# Patient Record
Sex: FEMALE | Race: WHITE | NOT HISPANIC OR LATINO | URBAN - METROPOLITAN AREA
[De-identification: names, ages, dates, MRNs, and addresses within clinical notes are randomized per-mention and may not be internally consistent; named-entity substitution may affect disease eponyms.]

---

## 2024-10-04 ENCOUNTER — PROCEDURE VISIT (OUTPATIENT)
Dept: GYNECOLOGY | Facility: CLINIC | Age: 34
End: 2024-10-04

## 2024-10-04 VITALS
TEMPERATURE: 99 F | DIASTOLIC BLOOD PRESSURE: 88 MMHG | RESPIRATION RATE: 18 BRPM | SYSTOLIC BLOOD PRESSURE: 128 MMHG | WEIGHT: 171 LBS | OXYGEN SATURATION: 100 % | HEART RATE: 82 BPM | BODY MASS INDEX: 25.91 KG/M2 | HEIGHT: 68 IN

## 2024-10-04 DIAGNOSIS — R87.810 ASCUS WITH POSITIVE HIGH RISK HPV CERVICAL: ICD-10-CM

## 2024-10-04 DIAGNOSIS — R87.610 ASCUS WITH POSITIVE HIGH RISK HPV CERVICAL: ICD-10-CM

## 2024-10-04 RX ORDER — OMEPRAZOLE 40 MG/1
40 CAPSULE, DELAYED RELEASE ORAL
COMMUNITY
Start: 2024-08-19

## 2024-10-04 RX ORDER — TRANEXAMIC ACID 650 MG/1
1300 TABLET ORAL 3 TIMES DAILY
COMMUNITY
Start: 2024-05-14

## 2024-10-04 RX ORDER — DIAZEPAM 10 MG/1
10 TABLET ORAL NIGHTLY
COMMUNITY
Start: 2024-09-23

## 2024-10-04 RX ORDER — DEXTROAMPHETAMINE SACCHARATE, AMPHETAMINE ASPARTATE, DEXTROAMPHETAMINE SULFATE AND AMPHETAMINE SULFATE 7.5; 7.5; 7.5; 7.5 MG/1; MG/1; MG/1; MG/1
2 TABLET ORAL
COMMUNITY
Start: 2024-09-23

## 2024-10-04 NOTE — PROCEDURES
Colposcopy    Date/Time: 10/4/2024 8:00 AM    Performed by: Baron Gillespie MD  Authorized by: Jeff Fam MD    Consent obatined:  Prior to procedure the appropriate consent was completed and verified  Timeout:Immediately prior to procedure a time out was called to verify the correct patient, procedure, equipment, support staff and site/side marked as required    Colposcopy Site:  Cervix  Acrowhite Lesion? Yes (Present along entire posterior cervix from 3-9 o'clock)    Atypical Vessels: No    Transformation Zone Adequate?: Yes    Biopsy?: Yes         Location:  Cervix ((8 00 and 6 00 (Areas of densest acetowhite lesions)))  ECC Performed?: Yes    LEEP Performed?: No    Estimated blood loss (cc):  5   Patient tolerated the procedure well with no immediate complications.   Post-operative instructions were provided for the patient.   Patient was discharged and will follow up if any complications occur              [facility] COLPOSCOPY CLINIC NOTE    Melba Dawn is a 34 y.o.  referred to [facility] colposcopy clinic from Dr. Neri.    Passing massive blood clots 2x per x4 years, periods heavier    Results of abnormal pap smear were discussed with patient. Indications/risks/benefits of colposcopy were discussed and consents were signed and witnessed prior to the procedure, all questions answered.    Patient reports a questionable abnormal PAP smear in 2013 ( last PAP smear) with recommended repeat in 1 yr which was normal at that time.    Pap/Colpo History:  2024: ascus, hr hpv + untyped    Sexual History:  Number of sex partners: Currently active with 1 partner for 3 years; Lifetime 20  Contraception: Nexplanon, OCPs  Future fertility desires: None  Smoking History: No  STD History: No  HIV status: Negative  HPV vaccination status: Not vaccinated, interested in 1st vaccine today    GYN History:  Last menstrual period: 2024    OB History:   OB History          2    Para   2    Term   2         "    AB        Living             SAB        IAB        Ectopic        Multiple        Live Births                   Past Medical History:   Diagnosis Date    Abnormal Pap smear of cervix      History reviewed. No pertinent surgical history.  Social History     Tobacco Use    Smoking status: Never    Smokeless tobacco: Never   Substance Use Topics    Alcohol use: Not Currently     Comment: occ    Drug use: Never     Review of Systems  Pertinent items are noted in HPI.    Objective:     /88 (BP Location: Right arm, Patient Position: Sitting)   Pulse 82   Temp 98.5 °F (36.9 °C) (Oral)   Resp 18   Ht 5' 8" (1.727 m)   Wt 77.6 kg (171 lb)   LMP 2024 (Exact Date)   SpO2 100%   BMI 26.00 kg/m²   Physical Exam:  Gen: Well-nourished, well-developed female appearing stated age. Alert, cooperative, in no acute distress.    Urine Pregnancy Test: Negative    SEE COLPOSCOPY PROCEDURE NOTE    Assessment:       34 y.o.  here for:  1. ASCUS with positive high risk HPV cervical  Ambulatory referral/consult to Gynecology    hpv vaccine,9-yusuf (GARDASIL 9) vaccine 0.5 mL    Specimen to Pathology Gynecology and Obstetrics           COLPOSCOPIC IMPRESSION: Mild dysplasia    Plan:     - Colposcopy adequate  - Precautions given to the patient to return to ED if vaginal bleeding, fevers, pain, or any other concerns. Patient expressed understanding and all questions were answered.  - Counseled about HPV vaccination, would like to begin gardasil series.  - Will contact patient with results and determine further follow up at that time.    Problem List Items Addressed This Visit    None  Visit Diagnoses       ASCUS with positive high risk HPV cervical        Relevant Medications    hpv vaccine,9-yusuf (GARDASIL 9) vaccine 0.5 mL    Other Relevant Orders    Specimen to Pathology Gynecology and Obstetrics          Discussed with Dr. Fam who was present for the entire procedure.      Baron Valerio MD  [facility] " [facility]

## 2024-10-20 ENCOUNTER — TELEPHONE (OUTPATIENT)
Dept: GYNECOLOGY | Facility: CLINIC | Age: 34
End: 2024-10-20
Payer: MEDICAID

## 2024-10-20 NOTE — TELEPHONE ENCOUNTER
MD attempted to contact patient regarding colposcopy results. Attempted to call all listed phone numbers with no success. HIPAA-compliant VM left on mobile phone number.     Will contact patient at another time.       Fiona Soto MD   PGY2, Obstetrics & Gynecology   [facility]

## 2024-11-11 ENCOUNTER — TELEPHONE (OUTPATIENT)
Dept: GYNECOLOGY | Facility: CLINIC | Age: 34
End: 2024-11-11
Payer: MEDICAID

## 2024-11-12 ENCOUNTER — TELEPHONE (OUTPATIENT)
Dept: GYNECOLOGY | Facility: CLINIC | Age: 34
End: 2024-11-12
Payer: MEDICAID

## 2024-11-12 NOTE — TELEPHONE ENCOUNTER
Called patient to inform that she needs to repeat pap in 1 year with co-testing. No other intervention at this time, per provider. Patient verbalized understanding.       ----- Message from Laura De La Rosa sent at 11/11/2024  8:09 PM CST -----  Regarding: RE: Request for Lab Results  Called back no answer. Repeat pap in 1 year with co-testing. No other intervention at this time.     Laura De La Rosa MD   PGY2, Obstetrics & Gynecology   [facility]  ----- Message -----  From: Kathy Resendiz MA  Sent: 11/8/2024   3:54 PM CST  To: [facility] Residents  Subject: FW: Request for Lab Results                        ----- Message -----  From: Eldon Lafleur  Sent: 11/8/2024   3:12 PM CST  To: [facility]ne[facility] Clinical Support Staff  Subject: Request for Lab Results                          Pt called to request lab results from her COLPO on 10/4/24.  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  Her call back # is [phone].

## 2024-11-12 NOTE — TELEPHONE ENCOUNTER
MD made HIPAA compliant phone call to discuss colpo results. No Answer. Patient to get pap in 1 year with cotesting.     Laura De La Rosa MD   PGY2, Obstetrics & Gynecology   [place]

## 2024-11-13 ENCOUNTER — TELEPHONE (OUTPATIENT)
Dept: GYNECOLOGY | Facility: CLINIC | Age: 34
End: 2024-11-13
Payer: MEDICAID

## 2024-11-13 NOTE — TELEPHONE ENCOUNTER
sent certified letter tracking number [id number] [phone] sent for patient to call for colpo results, has not answered phone calls -I have called 2 numbers and left voicemails for patient to call me back, will send certified letter to day ---- Message from ABBIE Kearney sent at 11/12/2024  2:48 PM CST -----  Regarding: FW: results  Called and LVM for patient to call us back for results  ----- Message -----  From: Laura De La Rosa MD  Sent: 11/11/2024   8:10 PM CST  To: Christel Gould RN  Subject: RE: results                                      Called patient back. No answer, plan is to repeat pap in 1 year with HPV cotesting. Thanks!     Laura De La Rosa MD   PGY2, Obstetrics & Gynecology   [facility]  ----- Message -----  From: Christel Gould RN  Sent: 11/7/2024   1:12 PM CST  To: Laura De La Rosa MD  Subject: FW: results                                      Looks like Dr Soto tried about a month ago but no f//u after no answer.  Would you be able to call this patient and give her her colpo results?  ----- Message -----  From: Shaheen Preston  Sent: 11/7/2024  11:15 AM CST  To: [facility] Gynecology Clinical Support Staff  Subject: results                                          The above pt called the clinic and asked for the results from her COLPO. Please advise, thanks

## 2024-11-13 NOTE — LETTER
[address] [facility] ? [place], [address] ? Phone [phone] ? Fax            November 13, 2024    CERTIFIED MAIL  RETURN RECEIPT REQUESTED    Melba Dawn  [address], [address]  [place] [address]        :    Please phone our office at [phone] so that we may speak to you about the results of the test you had done recently.    We look forward to hearing from you soon.      Sincerely,      [facility]  Dr Jeff Fam       
(1) body pink, extremities blue

## 2025-04-08 ENCOUNTER — TELEPHONE (OUTPATIENT)
Dept: GYNECOLOGY | Facility: CLINIC | Age: 35
End: 2025-04-08
Payer: MEDICAID